# Patient Record
Sex: MALE | Race: WHITE | NOT HISPANIC OR LATINO | URBAN - METROPOLITAN AREA
[De-identification: names, ages, dates, MRNs, and addresses within clinical notes are randomized per-mention and may not be internally consistent; named-entity substitution may affect disease eponyms.]

---

## 2024-03-09 ENCOUNTER — EMERGENCY (EMERGENCY)
Facility: HOSPITAL | Age: 69
LOS: 0 days | Discharge: HOME | End: 2024-03-09
Attending: STUDENT IN AN ORGANIZED HEALTH CARE EDUCATION/TRAINING PROGRAM
Payer: COMMERCIAL

## 2024-03-09 VITALS
RESPIRATION RATE: 17 BRPM | HEIGHT: 68 IN | SYSTOLIC BLOOD PRESSURE: 161 MMHG | HEART RATE: 101 BPM | WEIGHT: 199.96 LBS | TEMPERATURE: 98 F | OXYGEN SATURATION: 97 % | DIASTOLIC BLOOD PRESSURE: 99 MMHG

## 2024-03-09 DIAGNOSIS — R09.A2 FOREIGN BODY SENSATION, THROAT: ICD-10-CM

## 2024-03-09 DIAGNOSIS — I10 ESSENTIAL (PRIMARY) HYPERTENSION: ICD-10-CM

## 2024-03-09 PROCEDURE — 99283 EMERGENCY DEPT VISIT LOW MDM: CPT

## 2024-03-09 RX ORDER — GLUCAGON INJECTION, SOLUTION 0.5 MG/.1ML
2 INJECTION, SOLUTION SUBCUTANEOUS ONCE
Refills: 0 | Status: DISCONTINUED | OUTPATIENT
Start: 2024-03-09 | End: 2024-03-09

## 2024-03-09 NOTE — ED ADULT NURSE NOTE - OBJECTIVE STATEMENT
Pt presents to ED c/o pill stuck in throat. Pt states he took a pil Pt presents to ED c/o pill stuck in throat. Pt states he took a pill and is unable to get down his throat no matter how hard he swallows. Pt expresses discomfort. Pt  speaking in full sentences and no drooling noted. Safety maintained.

## 2024-03-09 NOTE — ED PROVIDER NOTE - PATIENT PORTAL LINK FT
You can access the FollowMyHealth Patient Portal offered by [facility] by registering at the following website: [email or web address]. By joining [facility]’s FollowMyHealth portal, you will also be able to view your health information using other applications (apps) compatible with our system.

## 2024-03-09 NOTE — ED PROVIDER NOTE - PHYSICAL EXAMINATION
General: well appearing lying in stretcher comfortably  HEENT: NC/AT, MMM, PERRL, no obvious foreign body in pharynx. Uvula midline  Cards: RRR no m/r/g  Pulm: CTAB no w/r/r  Neuro: axoxo3, face symmetric, speaking full sentences, ambulatory

## 2024-03-09 NOTE — ED PROVIDER NOTE - OBJECTIVE STATEMENT
69 yo M hx HTN here as took his magnesium supplement pill without water and has sensation of foreign body in throat. Pt speaking full sentences, tolerating secretions. Attempted carbonated drink and eating solids at home.

## 2024-03-09 NOTE — ED PROVIDER NOTE - CLINICAL SUMMARY MEDICAL DECISION MAKING FREE TEXT BOX
Pt here for globus sensation. Pt took magnesium pills without water. No resp distress and tolerating secretions. Tried carbonated drinks at home. 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 Pharyngeal exam done and no pills seen. Pt started coughing/retching after tongue depressor exam and feels as if may have coughed up foreign body. Pt tolerated water bottle and applesauce. Declining glucagon given improvement. Pt has slight irritation but declining viscous lido. Pt requesting dc home. Pt here for globus sensation. Pt took magnesium pills without water. No resp distress and tolerating secretions. Tried carbonated drinks at home. 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 Pharyngeal exam done and no pills seen. Pt started coughing/retching after tongue depressor exam and feels as if may have coughed up foreign body-small red substance seen in emesis basin and states mag pills were red. Pt tolerated water bottle and applesauce. Declining glucagon given improvement. Pt has slight irritation but declining viscous lido. Pt requesting dc home. Left without dc papers.

## 2024-03-09 NOTE — ED PROVIDER NOTE - NSFOLLOWUPINSTRUCTIONS_ED_ALL_ED_FT
You were evaluated in the ER for a foreign body sensation in the throat that resolved. Continue to drink plenty of fluids and avoid eating sharp/crunchy/spicy foods. Follow-up with your primary care doctor. Return to ER if symptoms persist or worsen.
